# Patient Record
Sex: MALE | Race: WHITE
[De-identification: names, ages, dates, MRNs, and addresses within clinical notes are randomized per-mention and may not be internally consistent; named-entity substitution may affect disease eponyms.]

---
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
## 2019-08-03 ENCOUNTER — HOSPITAL ENCOUNTER (EMERGENCY)
Dept: HOSPITAL 56 - MW.ED | Age: 19
Discharge: HOME | End: 2019-08-03
Payer: COMMERCIAL

## 2019-08-03 DIAGNOSIS — T67.5XXA: Primary | ICD-10-CM

## 2019-08-03 DIAGNOSIS — E86.0: ICD-10-CM
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
## 2019-08-03 LAB
BUN SERPL-MCNC: 13 MG/DL (ref 7–18)
CHLORIDE SERPL-SCNC: 105 MMOL/L (ref 98–107)
CO2 SERPL-SCNC: 25.3 MMOL/L (ref 21–32)
GLUCOSE SERPL-MCNC: 119 MG/DL (ref 74–106)
LIPASE SERPL-CCNC: 73 U/L (ref 73–393)
POTASSIUM SERPL-SCNC: 3.7 MMOL/L (ref 3.5–5.1)
SODIUM SERPL-SCNC: 140 MMOL/L (ref 136–148)

## 2019-08-03 PROCEDURE — 96361 HYDRATE IV INFUSION ADD-ON: CPT

## 2019-08-03 PROCEDURE — 36415 COLL VENOUS BLD VENIPUNCTURE: CPT

## 2019-08-03 PROCEDURE — 99284 EMERGENCY DEPT VISIT MOD MDM: CPT

## 2019-08-03 PROCEDURE — 83690 ASSAY OF LIPASE: CPT

## 2019-08-03 PROCEDURE — 80053 COMPREHEN METABOLIC PANEL: CPT

## 2019-08-03 PROCEDURE — 85025 COMPLETE CBC W/AUTO DIFF WBC: CPT

## 2019-08-03 PROCEDURE — 96374 THER/PROPH/DIAG INJ IV PUSH: CPT

## 2019-08-03 NOTE — EDM.PDOC
ED HPI GENERAL MEDICAL PROBLEM





- General


Chief Complaint: Gastrointestinal Problem


Stated Complaint: DEHYDRATION


Time Seen by Provider: 08/03/19 21:55





- History of Present Illness


INITIAL COMMENTS - FREE TEXT/NARRATIVE: 


HISTORY AND PHYSICAL:





History of present illness:


Patient's 19-year-old white male presents with a concern of dehydration patient 

states he's been working in the heat all days now. We'll keep up for fluids and 

I'll develop some nausea. He denies abdominal pain chest pain shortness breath 

or other concern





Review of systems: 


As per history of present illness and below otherwise all systems reviewed and 

negative.





Past medical history: 


As per history of present illness and as reviewed below otherwise 

noncontributory.





Surgical history: 


As per history of present illness and as reviewed below otherwise 

noncontributory.





Social history: 


No reported history of drug or alcohol abuse.





Family history: 


As per history of present illness and as reviewed below otherwise 

noncontributory.





Physical exam:


HEENT: Atraumatic, normocephalic, pupils reactive, negative for conjunctival 

pallor or scleral icterus, mucous membranes dry, throat clear, neck supple, 

nontender, trachea midline.


Lungs: Clear to auscultation, breath sounds equal bilaterally, chest nontender.


Heart: S1S2, regular, negative for clicks, rubs, or JVD.


Abdomen: Soft, nondistended, nontender. Negative for masses or 

hepatosplenomegaly. Negative for costovertebral tenderness.


Pelvis: Stable nontender.


Genitourinary: Deferred.


Rectal: Deferred.


Extremities: Atraumatic, negative for cords or calf pain. Neurovascular 

unremarkable.


Neuro: Awake, alert, oriented. Cranial nerves II through XII unremarkable. 

Cerebellum unremarkable. Motor and sensory unremarkable throughout. Exam 

nonfocal.





Diagnostics:


CBC CMP lipase





Therapeutics:


Saline 2 L bolus Zofran 4 mg IV





Impression: 


#1 heat illness with dehydration





Definitive disposition and diagnosis as appropriate pending reevaluation and 

review of above.








- Related Data


 Allergies











Allergy/AdvReac Type Severity Reaction Status Date / Time


 


No Known Allergies Allergy   Verified 08/03/19 21:11











Home Meds: 


 Home Meds





Anxiety Medications  07/26/19 [History]


Depression Medication  07/26/19 [History]











Past Medical History





- Past Health History


Medical/Surgical History: Denies Medical/Surgical History





Social & Family History





- Family History


Family Medical History: Noncontributory





ED ROS GENERAL





- Review of Systems


Review Of Systems: ROS reveals no pertinent complaints other than HPI.





ED EXAM, GENERAL





- Physical Exam


Exam: See Below (Dictation)





Course





- Vital Signs


Last Recorded V/S: 


 Last Vital Signs











Temp  36.4 C   08/03/19 21:12


 


Pulse  115 H  08/03/19 21:12


 


Resp  16   08/03/19 21:12


 


BP  150/109 H  08/03/19 21:12


 


Pulse Ox  95   08/03/19 21:12














- Orders/Labs/Meds


Orders: 


 Active Orders 24 hr











 Category Date Time Status


 


 Sodium Chloride 0.9% [Normal Saline] 2,000 ml Med  08/03/19 21:12 Active





 IV STAT   








 Medication Orders





Sodium Chloride (Normal Saline)  2,000 mls @ 999 mls/hr IV STAT ONE


   Stop: 08/03/19 23:12


   Last Admin: 08/03/19 21:25  Dose: 999 mls/hr








Labs: 


 Laboratory Tests











  08/03/19 08/03/19 Range/Units





  21:20 21:20 


 


WBC  11.41 H   (4.0-11.0)  K/uL


 


RBC  5.48   (4.50-5.90)  M/uL


 


Hgb  17.0   (13.0-17.0)  g/dL


 


Hct  49.6   (38.0-50.0)  %


 


MCV  90.5   (80.0-98.0)  fL


 


MCH  31.0   (27.0-32.0)  pg


 


MCHC  34.3   (31.0-37.0)  g/dL


 


RDW Std Deviation  44.2   (28.0-62.0)  fl


 


RDW Coeff of Shonna  14   (11.0-15.0)  %


 


Plt Count  246   (150-400)  K/uL


 


MPV  10.70   (7.40-12.00)  fL


 


Add Manual Diff  YES   


 


Neutrophils % (Manual)  69   (48.0-80.0)  %


 


Band Neutrophils %  3   %


 


Lymphocytes % (Manual)  21   (16.0-40.0)  %


 


Monocytes % (Manual)  7   (0.0-15.0)  %


 


Nucleated RBC %  0.0   /100WBC


 


Absolute Seg Neuts  7.9 H   (1.4-5.7)  


 


Band Neutrophils #  0.3   


 


Lymphocytes # (Manual)  2.4   (0.6-2.4)  


 


Monocytes # (Manual)  0.8   (0.0-0.8)  


 


Nucleated RBCs #  0   K/uL


 


Sodium   140  (136-148)  mmol/L


 


Potassium   3.7  (3.5-5.1)  mmol/L


 


Chloride   105  ()  mmol/L


 


Carbon Dioxide   25.3  (21.0-32.0)  mmol/L


 


BUN   13  (7.0-18.0)  mg/dL


 


Creatinine   1.0  (0.8-1.3)  mg/dL


 


Est Cr Clr Drug Dosing   102.50  mL/min


 


Estimated GFR (MDRD)   > 60.0  ml/min


 


Glucose   119 H  ()  mg/dL


 


Calcium   9.3  (8.5-10.1)  mg/dL


 


Total Bilirubin   0.5  (0.2-1.0)  mg/dL


 


AST   18  (15-37)  IU/L


 


ALT   52  (14-63)  IU/L


 


Alkaline Phosphatase   96  ()  U/L


 


Total Protein   7.9  (6.4-8.2)  g/dL


 


Albumin   4.1  (3.4-5.0)  g/dL


 


Globulin   3.8  (2.6-4.0)  g/dL


 


Albumin/Globulin Ratio   1.1  (0.9-1.6)  


 


Lipase   73  ()  U/L











Meds: 


Medications











Generic Name Dose Route Start Last Admin





  Trade Name Freq  PRN Reason Stop Dose Admin


 


Sodium Chloride  2,000 mls @ 999 mls/hr  08/03/19 21:12  08/03/19 21:25





  Normal Saline  IV  08/03/19 23:12  999 mls/hr





  STAT ONE   Administration





     





     





     





     














Discontinued Medications














Generic Name Dose Route Start Last Admin





  Trade Name Freq  PRN Reason Stop Dose Admin


 


Ondansetron HCl  4 mg  08/03/19 21:12  08/03/19 21:25





  Zofran  IVPUSH  08/03/19 21:13  4 mg





  ONETIME ONE   Administration





     





     





     





     














Departure





- Departure


Time of Disposition: 21:55


Disposition: Home, Self-Care 01


Condition: Good


Clinical Impression: 


 Dehydration, Heat exhaustion








- Discharge Information


Referrals: 


PCP,None [Primary Care Provider] - 


Forms:  ED Department Discharge


Additional Instructions: 


The following information is given to patients seen in the emergency department 

who are being discharged to home. This information is to outline your options 

for follow-up care. We provide all patients seen in our emergency department 

with a follow-up referral.





The need for follow-up, as well as the timing and circumstances, are variable 

depending upon the specifics of your emergency department visit.





If you don't have a primary care physician on staff, we will provide you with a 

referral. We always advise you to contact your personal physician following an 

emergency department visit to inform them of the circumstance of the visit and 

for follow-up with them and/or the need for any referrals to a consulting 

specialist.





The emergency department will also refer you to a specialist when appropriate. 

This referral assures that you have the opportunity for followup care with a 

specialist. All of these measure are taken in an effort to provide you with 

optimal care, which includes your followup.





Under all circumstances we always encourage you to contact your private 

physician who remains a resource for coordinating  your care. When calling for 

followup care, please make the office aware that this follow-up is from your 

recent emergency room visit. If for any reason you are refused follow-up, 

please contact the [facility] emergency department at [phone] 

and asked to speak to the emergency department charge nurse.




















Push fluids activity as discussed follow-up primary medical doctor as discussed 

return as needed as discussed





- My Orders


Last 24 Hours: 


My Active Orders





08/03/19 21:12


Sodium Chloride 0.9% [Normal Saline] 2,000 ml IV STAT 














- Assessment/Plan


Last 24 Hours: 


My Active Orders





08/03/19 21:12


Sodium Chloride 0.9% [Normal Saline] 2,000 ml IV STAT

## 2019-08-05 ENCOUNTER — HOSPITAL ENCOUNTER (EMERGENCY)
Dept: HOSPITAL 56 - MW.ED | Age: 19
Discharge: HOME | End: 2019-08-05
Payer: COMMERCIAL

## 2019-08-05 DIAGNOSIS — Z79.899: ICD-10-CM

## 2019-08-05 DIAGNOSIS — F41.9: Primary | ICD-10-CM

## 2019-08-05 DIAGNOSIS — F32.9: ICD-10-CM

## 2019-08-05 PROCEDURE — 93005 ELECTROCARDIOGRAM TRACING: CPT

## 2019-08-05 PROCEDURE — 71046 X-RAY EXAM CHEST 2 VIEWS: CPT

## 2019-08-05 PROCEDURE — 99285 EMERGENCY DEPT VISIT HI MDM: CPT

## 2019-08-05 PROCEDURE — 94640 AIRWAY INHALATION TREATMENT: CPT

## 2019-08-05 NOTE — EDM.PDOC
ED HPI GENERAL MEDICAL PROBLEM





- General


Chief Complaint: Chest Pain


Stated Complaint: PT HAS CHEST PAINS


Time Seen by Provider: 08/05/19 22:04


Source of Information: Reports: Patient


History Limitations: Reports: No Limitations





- History of Present Illness


INITIAL COMMENTS - FREE TEXT/NARRATIVE: 


HISTORY AND PHYSICAL:





History of present illness:


Patient is a 19-year-old male who presents to the emergency room with 

complaints of chest pain 2 weeks. Patient was just seen in our emergency 

department 2 days ago for dehydration and heat exposure. At that time he did 

have routine lab work and received 2 L of IV fluids. He reports he forgot to 

mention the chest pain he has been having. Patient denies any fever, chills, 

headache, change in vision, syncope or near syncope. Denies any neck pain, back 

pain or cough. Denies any abdominal pain, nausea, vomiting, diarrhea, 

constipation or dysuria. Has not noted any blood in urine or stool. Patient has 

been eating and drinking appropriately.





Review of systems: 


As per history of present illness and below otherwise all systems reviewed and 

negative.





Past medical history: 


As per history of present illness and as reviewed below otherwise 

noncontributory.





Surgical history: 


As per history of present illness and as reviewed below otherwise 

noncontributory.





Social history: 


See social history for further information





Family history: 


As per history of present illness and as reviewed below otherwise 

noncontributory.





Physical exam:


General: Well developed and well nourished 19-year-old male. Alert and 

oriented. Nontoxic appearing and in no acute distress.


HEENT: Atraumatic, normocephalic, pupils equal and reactive bilaterally, 

negative for conjunctival pallor or scleral icterus, mucous membranes moist, 

trachea midline. No drooling or trismus noted. No meningeal signs. No hot 

potato voice noted. 


Lungs: Diminished posterior bases otherwise clear to auscultation, breath 

sounds equal bilaterally, chest nontender.


Heart: S1S2, regular rate and rhythm without overt murmur


Abdomen: Soft, nondistended, nontender. 


Skin: Intact, warm, dry. No lesions or rashes noted.


Extremities: Atraumatic, moves all extremities per self without difficulty or 

deficits, negative for cords or calf pain. Neurovascular unremarkable.


Neuro: Awake, alert, oriented. Cranial nerves II through XII unremarkable. 

Cerebellum unremarkable. Motor and sensory unremarkable throughout. Exam 

nonfocal.





Notes:


Labs that were done 2 days prior were normal. We discussed the need for 

repeating these, patient agrees that the do not need to be repeated today. 

Patient does appear anxious, does have a history of anxiety and depression. We'

ll give him some Ativan by mouth while here. Supportive care measures were 

reviewed and discussed. Voices understanding and is agreeable to plan of care. 

Denies any further questions or concerns at this time.





Diagnostics:


EKG, chest x-ray





Therapeutics:


Ativan, Duo Neb





Prescription:


Pro-Air





Impression: 


Chest pain, nonspecific


Anxiety





Plan:


1. Please use Tylenol and/or Ibuprofen as needed for pain and fever management. 


2. Continue taking your home medications as directed. May use inhaler as needed.


3. Please follow up with your primary care provider. Return to the ED as needed 

as discussed. 





Definitive disposition and diagnosis as appropriate pending reevaluation and 

review of above.





  ** chest


Pain Score (Numeric/FACES): 8





- Related Data


 Allergies











Allergy/AdvReac Type Severity Reaction Status Date / Time


 


No Known Allergies Allergy   Verified 08/05/19 22:29











Home Meds: 


 Home Meds





FLUoxetine [PROzac] 60 mg PO DAILY 08/05/19 [History]


cloNIDine [Catapres] 0.1 mg PO ASDIRECTED 08/05/19 [History]











Past Medical History





- Past Health History


Medical/Surgical History: Denies Medical/Surgical History


Psychiatric History: Reports: Anxiety, Depression





Social & Family History





- Family History


Family Medical History: Noncontributory





- Caffeine Use


Caffeine Use: Reports: Soda





ED ROS GENERAL





- Review of Systems


Review Of Systems: ROS reveals no pertinent complaints other than HPI.





ED EXAM, GENERAL





- Physical Exam


Exam: See Below (See dictation)





Course





- Vital Signs


Last Recorded V/S: 


 Last Vital Signs











Temp  97.3 F   08/05/19 22:14


 


Pulse  82   08/05/19 22:31


 


Resp  20   08/05/19 22:31


 


BP  125/73   08/05/19 22:31


 


Pulse Ox  95   08/05/19 22:31














- Orders/Labs/Meds


Orders: 


 Active Orders 24 hr











 Category Date Time Status


 


 EKG Documentation Completion [RC] STAT Care  08/05/19 22:04 Active


 


 Chest 2V [CR] Stat Exams  08/05/19 22:04 Ordered











Meds: 


Medications














Discontinued Medications














Generic Name Dose Route Start Last Admin





  Trade Name Divine  PRN Reason Stop Dose Admin


 


Lorazepam  1 mg  08/05/19 22:27  08/05/19 22:30





  Ativan  PO  08/05/19 22:28  1 mg





  ONETIME ONE   Administration





     





     





     





     














Departure





- Departure


Time of Disposition: 22:48


Disposition: Home, Self-Care 01


Clinical Impression: 


 Nonspecific chest pain, Anxiety





Instructions:  Nonspecific Chest Pain, Easy-to-Read


Referrals: 


PCP,None [Primary Care Provider] - 


Forms:  ED Department Discharge


Additional Instructions: 


The following information is given to patients seen in the emergency department 

who are being discharged to home. This information is to outline your options 

for follow-up care. We provide all patients seen in our emergency department 

with a follow-up referral.





The need for follow-up, as well as the timing and circumstances, are variable 

depending upon the specifics of your emergency department visit.





If you don't have a primary care physician on staff, we will provide you with a 

referral. We always advise you to contact your personal physician following an 

emergency department visit to inform them of the circumstance of the visit and 

for follow-up with them and/or the need for any referrals to a consulting 

specialist.





The emergency department will also refer you to a specialist when appropriate. 

This referral assures that you have the opportunity for follow-up care with a 

specialist. All of these measure are taken in an effort to provide you with 

optimal care, which includes your follow-up.





Under all circumstances we always encourage you to contact your private 

physician who remains a resource for coordinating your care. When calling for 

follow-up care, please make the office aware that this follow-up is from your 

recent emergency room visit. If for any reason you are refused follow-up, 

please contact the [facility] Emergency 

Department at [phone] and asked to speak to the emergency department 

charge nurse.





[facility]


Primary Care


[address] [place] [id number]


Phone: [phone]


Fax: [phone]





[facility]


[address] [place] [address]


Phone: [phone]


Fax: [phone]





1. Please use Tylenol and/or Ibuprofen as needed for pain and fever management. 


2. Continue taking your home medications as directed. May use inhaler as needed.


3. Please follow up with your primary care provider. Return to the ED as needed 

as discussed. 





- My Orders


Last 24 Hours: 


My Active Orders





08/05/19 22:04


EKG Documentation Completion [RC] STAT 


Chest 2V [CR] Stat 














- Assessment/Plan


Last 24 Hours: 


My Active Orders





08/05/19 22:04


EKG Documentation Completion [RC] STAT 


Chest 2V [CR] Stat

## 2019-08-05 NOTE — CR
INDICATION: Pain/shortness of breath



TECHNIQUE:



Chest 2 views.



COMPARISON:



None.



FINDINGS:



Cardiovascular and mediastinum:  Heart size and vasculature are normal in 

caliber and appearance.  Mediastinum is within normal limits.  



Lungs and pleural spaces:  Lungs are clear.  No sign of infiltrate or mass. 

 No sign of pleural effusion.  No pneumothorax.  



Bones and soft tissues:  No significant findings.  



IMPRESSION:



Unremarkable chest.



Dictated by: Cooper Reyes MD @ 08/05/2019 23:11:04



(Electronically Signed)

## 2021-06-20 ENCOUNTER — HOSPITAL ENCOUNTER (EMERGENCY)
Dept: HOSPITAL 56 - MW.ED | Age: 21
Discharge: HOME | End: 2021-06-20
Payer: COMMERCIAL

## 2021-06-20 DIAGNOSIS — H60.332: Primary | ICD-10-CM

## 2021-06-20 NOTE — EDM.PDOC
ED HPI GENERAL MEDICAL PROBLEM





- General


Chief Complaint: ENT Problem


Stated Complaint: CANT HEAR OUT OF LFT EAR


Time Seen by Provider: 06/20/21 10:11


Source of Information: Reports: Patient


History Limitations: Reports: No Limitations





- History of Present Illness


INITIAL COMMENTS - FREE TEXT/NARRATIVE: 





HISTORY AND PHYSICAL:





History of present illness:


The patient is a 21-year-old male who presents to the emergency department with 

complaints of decreased hearing in his left ear after swimming yesterday.  The 

patient has a history of wax buildup and frequently visits the ENT for removal 

of earwax.  The last visit was approximately 1 month ago.  The patient denies 

any kind of pain.  Patient denies any fever, chills, headache, change in vision,

syncope or near syncope. Denies any chest pain, back pain, shortness of breath 

or cough. Denies any abdominal pain, nausea, vomiting, diarrhea, constipation or

dysuria. Has not noted any blood in urine or stool. Patient has been eating and 

drinking appropriately.





Review of systems: 


As per history of present illness and below otherwise all systems reviewed and 

negative.





Past medical history: 


As per history of present illness and as reviewed below otherwise 

noncontributory.





Surgical history: 


As per history of present illness and as reviewed below otherwise 

noncontributory.





Social history: 


See social history for further information





Family history: 


As per history of present illness and as reviewed below otherwise 

noncontributory.





Physical exam:


General: Well developed and well nourished. Alert and orientated x 3. Nontoxic 

in appearance and in no acute distress. Vital signs are stable and have been 

reviewed by me. Nursing notes were reviewed. 


HEENT: Atraumatic, normocephalic, pupils equal and reactive bilaterally, 

negative for conjunctival pallor or scleral icterus, mucous membranes moist, 

Right TM with with yellow hue, Left TM hard to visualize due to cerumen, left 

ear canal red with slight swelling,  throat clear, neck supple, nontender, 

trachea midline. No drooling or trismus noted. No meningeal signs. No hot potato

voice noted. 


Lungs: Clear to auscultation bilaterally. No wheezes, rales, or rhonchi.   Chest

nontender. Normal work of breathing, no accessory muscles used.


Heart: S1S2, regular rate and rhythm without overt murmur, gallops, or rubs. No 

JVD. No peripheral edema


Abdomen: Soft, nondistended, nontender. Normoactive bowel sounds. Negative for 

masses or costovertebral tenderness.


Skin: Intact, warm, dry. No lesions or rashes noted.


Hematologic: No petechiae or purpra. Mucosa appropriate color and normal nail 

bed color and refill.


Extremities: Atraumatic, moves all extremities per self without difficulty or 

deficits, negative for cords or calf pain. Neurovascular unremarkable.


Neuro: Awake, alert, oriented. Cranial nerves II through XII unremarkable. 

Cerebellum unremarkable. Motor and sensory unremarkable throughout. Exam 

nonfocal.


Psychiatric: Mood and affect are appropriate.  Normal thought process. Answering

questions appropriately.





Notes:


*This patient was seen and evaluated during the 2020 SARS-CoV-2 novel 

coronavirus pandemic period.  Community viral transmission is ongoing at time of

this encounter and the emergency department is operating under pandemic response

procedures.





As stated above the patient is here for complaints of decreased hearing in his l

eft ear after swimming yesterday.  The patient denies pain.  With his erythema 

and swelling of his left external ear canal I will prescribe Cortisporin Otic 4 

drops 3 times a day.  The patient is agreeable with this discharge plan








I have talked with the patient about today's findings, in addition to providing 

specific details for plan of care.  Reassessment at the time of disposition 

demonstrates that the patient is in no acute distress.  The patient is stable 

for discharge, counseling was provided and we discussed in great detail signs 

and symptoms that would prompt them to return to the Emergency Department. 

Medication, follow up and supportive care measures were reviewed and discussed. 

Voices understanding and is agreeable to plan of care. Denies any further 

questions or concerns at this time.





Prescription: Cortisporin Otic 4 drops 3 times a day 





Impression: Otitis Externa





Plan:


1.  You were evaluated today on an emergent basis. Your complaints of decreased 

hearing in the left ear were evaluated and found to be a type of swimmer's ear, 

which we will treat with Cortisporin eardrops 4 drops 3 times a day in your left

ear.  Once you have completed the drops you can use Debrox for earwax removal.  

This is over-the-counter.  Use the Debrox twice a day 5 to 4 days.  Here 

bubbling and noises in your ear after application of the Debrox this is normal.


2.  You can alternate Tylenol and ibuprofen as needed for pain and fever 

management.


3. We encourage you to follow up with your primary care provider and/or 

recommended specialist in the next few days for re-evaluation and further 

care/management. 


4. If your symptoms should worsen, new symptoms develop or any of the signs and 

symptoms we discussed should arise please return to the emergency room or call 

[phone] (if needed).





Definitive disposition and diagnosis as appropriate pending reevaluation and 

review of above.








- Related Data


                                    Allergies











Allergy/AdvReac Type Severity Reaction Status Date / Time


 


No Known Allergies Allergy   Verified 06/20/21 10:11











Home Meds: 


                                    Home Meds





FLUoxetine [PROzac] 20 mg PO DAILY 06/20/21 [History]


Hydrocort/Neomycin/Polymyxin B [Neomycin-Polymixin-HC Otic Susp] 10 ml EARLF TID

7 Days #1 bottle 06/20/21 [Rx]











Past Medical History





- Past Health History


Medical/Surgical History: Denies Medical/Surgical History


HEENT History: Reports: None


Psychiatric History: Reports: Anxiety, Depression





- Past Surgical History


HEENT Surgical History: Reports: Adenoidectomy, Myringotomy w Tube(s), 

Tonsillectomy





Social & Family History





- Family History


Family Medical History: No Pertinent Family History





- Caffeine Use


Caffeine Use: Reports: Soda





ED ROS ENT





- Review of Systems


Review Of Systems: Comprehensive ROS is negative, except as noted in HPI.





ED EXAM, ENT





- Physical Exam


Exam: See Below (See dictation)





Course





- Vital Signs


Last Recorded V/S: 


                                Last Vital Signs











Temp  97.2 F   06/20/21 10:12


 


Pulse  96   06/20/21 10:12


 


Resp  16   06/20/21 10:12


 


BP  150/87 H  06/20/21 10:12


 


Pulse Ox  97   06/20/21 10:12














Departure





- Departure


Time of Disposition: 10:26


Disposition: Home, Self-Care 01


Condition: Good


Clinical Impression: 


Otitis externa


Qualifiers:


 Otitis externa type: swimmer's ear Chronicity: acute Laterality: left Qualified

 Code(s): H60.332 - Swimmer's ear, left ear








- Discharge Information


*PRESCRIPTION DRUG MONITORING PROGRAM REVIEWED*: Not Applicable


*COPY OF PRESCRIPTION DRUG MONITORING REPORT IN PATIENT [facility] MARIA: Not Applicable


Prescriptions: 


Hydrocort/Neomycin/Polymyxin B [Neomycin-Polymixin-HC Otic Susp] 10 ml EARLF TID

7 Days #1 bottle


Instructions:  Otitis Externa, Easy-to-Read


Referrals: 


John Blankenship MD [Primary Care Provider] - 


Forms:  ED Department Discharge


Additional Instructions: 


The following information is given to patients seen in the emergency department 

who are being discharged to home. This information is to outline your options 

for follow-up care. We provide all patients seen in our emergency department 

with a follow-up referral.





The need for follow-up, as well as the timing and circumstances, are variable 

depending upon the specifics of your emergency department visit.





If you don't have a primary care physician on staff, we will provide you with a 

referral. We always advise you to contact your personal physician following an 

emergency department visit to inform them of the circumstance of the visit and 

for follow-up with them and/or the need for any referrals to a consulting 

specialist.





The emergency department will also refer you to a specialist when appropriate. 

This referral assures that you have the opportunity for follow-up care with a 

specialist. All of these measure are taken in an effort to provide you with 

optimal care, which includes your follow-up.





Under all circumstances we always encourage you to contact your private 

physician who remains a resource for coordinating your care. When calling for 

follow-up care, please make the office aware that this follow-up is from your 

recent emergency room visit. If for any reason you are refused follow-up, please

contact the [facility] Emergency Department

at [phone] and asked to speak to the emergency department charge nurse.





[facility]


[address] [place] [id number]


Phone: [phone]


Fax: [phone]





[facility]


[address] [place] [id number]


Phone: [phone]


Fax: [phone]





Plan:


1.  You were evaluated today on an emergent basis. Your complaints of decreased 

hearing in the left ear were evaluated and found to be a type of swimmer's ear, 

which we will treat with Cortisporin eardrops 4 drops 3 times a day in your left

ear.  Once you have completed the drops you can use Debrox for earwax removal.  

This is over-the-counter.  Use the Debrox twice a day 5 to 4 days.  Here 

bubbling and noises in your ear after application of the Debrox this is normal.


2.  You can alternate Tylenol and ibuprofen as needed for pain and fever 

management.


3. We encourage you to follow up with your primary care provider and/or 

recommended specialist in the next few days for re-evaluation and further 

care/management. 


4. If your symptoms should worsen, new symptoms develop or any of the signs and 

symptoms we discussed should arise please return to the emergency room or call 

911 (if needed).





Sepsis Event Note (ED)





- Focused Exam


Vital Signs: 


                                   Vital Signs











  Temp Pulse Resp BP Pulse Ox


 


 06/20/21 10:12  97.2 F  96  16  150/87 H  97